# Patient Record
Sex: MALE | Race: WHITE
[De-identification: names, ages, dates, MRNs, and addresses within clinical notes are randomized per-mention and may not be internally consistent; named-entity substitution may affect disease eponyms.]

---

## 2020-11-17 ENCOUNTER — HOSPITAL ENCOUNTER (EMERGENCY)
Dept: HOSPITAL 26 - MED | Age: 60
Discharge: HOME | End: 2020-11-17
Payer: COMMERCIAL

## 2020-11-17 VITALS — SYSTOLIC BLOOD PRESSURE: 130 MMHG | DIASTOLIC BLOOD PRESSURE: 81 MMHG

## 2020-11-17 VITALS — DIASTOLIC BLOOD PRESSURE: 100 MMHG | SYSTOLIC BLOOD PRESSURE: 158 MMHG

## 2020-11-17 VITALS — HEIGHT: 72 IN | BODY MASS INDEX: 33.18 KG/M2 | WEIGHT: 245 LBS

## 2020-11-17 DIAGNOSIS — S60.410A: ICD-10-CM
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
## 2020-11-17 DIAGNOSIS — Z79.82: ICD-10-CM
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
## 2020-11-17 DIAGNOSIS — Z79.899: ICD-10-CM

## 2020-11-17 DIAGNOSIS — S43.401A: Primary | ICD-10-CM
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
## 2020-11-17 NOTE — NUR
pt placed on right shoulder immobilizer. Pulses present and strong. Capillary 
refill < 2s. tolerated well.

## 2020-11-17 NOTE — NUR
Patient discharged with v/s stable. Written and verbal after care instructions 
given and explained. 

Patient alert, oriented and verbalized understanding of instructions. 
Ambulatory with steady gait. All questions addressed prior to discharge. ID 
band removed. Patient advised to follow up with PMD. Rx of motrin given. 
Patient educated on indication of medication including possible reaction and 
side effects. Opportunity to ask questions provided and answered. copy of Xray 
results sent home with the patient.
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
## 2023-07-27 ENCOUNTER — HOSPITAL ENCOUNTER (EMERGENCY)
Dept: HOSPITAL 26 - MED | Age: 63
Discharge: HOME | End: 2023-07-27
Payer: COMMERCIAL

## 2023-07-27 VITALS
RESPIRATION RATE: 18 BRPM | HEART RATE: 77 BPM | SYSTOLIC BLOOD PRESSURE: 130 MMHG | OXYGEN SATURATION: 99 % | TEMPERATURE: 97.8 F | DIASTOLIC BLOOD PRESSURE: 75 MMHG

## 2023-07-27 VITALS — HEIGHT: 72 IN | BODY MASS INDEX: 34.54 KG/M2 | WEIGHT: 255 LBS

## 2023-07-27 DIAGNOSIS — S93.401A: Primary | ICD-10-CM
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
## 2023-07-27 DIAGNOSIS — Z79.1: ICD-10-CM
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
## 2023-07-27 DIAGNOSIS — R03.0: ICD-10-CM

## 2023-07-27 DIAGNOSIS — Z79.899: ICD-10-CM
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
## 2023-07-27 DIAGNOSIS — Z79.82: ICD-10-CM

## 2023-07-27 DIAGNOSIS — S96.911A: ICD-10-CM

## 2023-07-27 NOTE — NUR
Patient discharged with v/s stable. Written and verbal after care instructions 
FOR ANKLE AND FOOT SPRAIN given and explained. 

Patient alert, oriented and verbalized understanding of instructions. 
Ambulatory with steady gait. All questions addressed prior to discharge. ID 
band removed. Patient advised to follow up with PMD. Rx of IBUPROFEN given.  
Opportunity to ask questions provided and answered.



COPY OF XRAY PROVIDED